# Patient Record
Sex: FEMALE | Race: BLACK OR AFRICAN AMERICAN
[De-identification: names, ages, dates, MRNs, and addresses within clinical notes are randomized per-mention and may not be internally consistent; named-entity substitution may affect disease eponyms.]

---

## 2018-01-08 ENCOUNTER — HOSPITAL ENCOUNTER (INPATIENT)
Dept: HOSPITAL 26 - MED | Age: 23
LOS: 2 days | Discharge: HOME | DRG: 781 | End: 2018-01-10
Attending: FAMILY MEDICINE | Admitting: FAMILY MEDICINE
Payer: SELF-PAY

## 2018-01-08 VITALS — SYSTOLIC BLOOD PRESSURE: 109 MMHG | DIASTOLIC BLOOD PRESSURE: 75 MMHG

## 2018-01-08 VITALS — SYSTOLIC BLOOD PRESSURE: 113 MMHG | DIASTOLIC BLOOD PRESSURE: 72 MMHG

## 2018-01-08 VITALS — WEIGHT: 174 LBS | BODY MASS INDEX: 26.37 KG/M2 | HEIGHT: 68 IN

## 2018-01-08 DIAGNOSIS — O25.11: ICD-10-CM

## 2018-01-08 DIAGNOSIS — N17.0: ICD-10-CM

## 2018-01-08 DIAGNOSIS — O99.011: ICD-10-CM

## 2018-01-08 DIAGNOSIS — G43.909: ICD-10-CM

## 2018-01-08 DIAGNOSIS — O99.351: ICD-10-CM

## 2018-01-08 DIAGNOSIS — O26.831: ICD-10-CM

## 2018-01-08 DIAGNOSIS — G89.29: ICD-10-CM

## 2018-01-08 DIAGNOSIS — Z3A.10: ICD-10-CM

## 2018-01-08 DIAGNOSIS — O23.41: ICD-10-CM

## 2018-01-08 DIAGNOSIS — D64.9: ICD-10-CM

## 2018-01-08 DIAGNOSIS — Z90.89: ICD-10-CM

## 2018-01-08 DIAGNOSIS — O21.0: Primary | ICD-10-CM

## 2018-01-08 DIAGNOSIS — G92: ICD-10-CM

## 2018-01-08 DIAGNOSIS — F12.10: ICD-10-CM

## 2018-01-08 DIAGNOSIS — E44.0: ICD-10-CM

## 2018-01-08 DIAGNOSIS — Z88.0: ICD-10-CM

## 2018-01-08 LAB
ALBUMIN FLD-MCNC: 3.2 G/DL (ref 3.4–5)
AMYLASE SERPL-CCNC: 66 U/L (ref 25–115)
ANION GAP SERPL CALCULATED.3IONS-SCNC: 14.6 MMOL/L (ref 8–16)
APPEARANCE UR: (no result)
AST SERPL-CCNC: 19 U/L (ref 15–37)
BASOPHILS # BLD AUTO: 0.3 K/UL (ref 0–0.22)
BASOPHILS NFR BLD AUTO: 0 % (ref 0–2)
BILIRUB SERPL-MCNC: 0.3 MG/DL (ref 0–1)
BILIRUB UR QL STRIP: (no result)
BUN SERPL-MCNC: 8 MG/DL (ref 7–18)
CHLORIDE SERPL-SCNC: 102 MMOL/L (ref 98–107)
CHOLEST/HDLC SERPL: 2.8 {RATIO} (ref 1–4.5)
CO2 SERPL-SCNC: 25.8 MMOL/L (ref 21–32)
COLOR UR: YELLOW
CREAT SERPL-MCNC: 0.6 MG/DL (ref 0.6–1.3)
EOSINOPHIL # BLD AUTO: 0 K/UL (ref 0–0.4)
EOSINOPHIL NFR BLD AUTO: 0 % (ref 0–4)
ERYTHROCYTE [DISTWIDTH] IN BLOOD BY AUTOMATED COUNT: 14.8 % (ref 11.6–13.7)
GFR SERPL CREATININE-BSD FRML MDRD: 164 ML/MIN (ref 90–?)
GLUCOSE SERPL-MCNC: 89 MG/DL (ref 74–106)
GLUCOSE UR STRIP-MCNC: NEGATIVE MG/DL
HCT VFR BLD AUTO: 36.5 % (ref 36–48)
HDLC SERPL-MCNC: 63 MG/DL (ref 40–60)
HGB BLD-MCNC: 11.6 G/DL (ref 12–16)
HGB UR QL STRIP: NEGATIVE
LDLC SERPL CALC-MCNC: 97 MG/DL (ref 60–100)
LEUKOCYTE ESTERASE UR QL STRIP: (no result)
LIPASE SERPL-CCNC: 143 U/L (ref 73–393)
LYMPHOCYTES # BLD AUTO: 1.5 K/UL (ref 2.5–16.5)
LYMPHOCYTES NFR BLD AUTO: 25 % (ref 20.5–51.1)
MAGNESIUM SERPL-MCNC: 1.7 MG/DL (ref 1.8–2.4)
MCH RBC QN AUTO: 25 PG (ref 27–31)
MCHC RBC AUTO-ENTMCNC: 32 G/DL (ref 33–37)
MCV RBC AUTO: 79 FL (ref 80–94)
MONOCYTES # BLD AUTO: 0.5 K/UL (ref 0.8–1)
MONOCYTES NFR BLD AUTO: 9 % (ref 1.7–9.3)
NEUTROPHILS # BLD AUTO: 3.8 K/UL (ref 1.8–7.7)
NEUTROPHILS NFR BLD AUTO: 66 % (ref 42.2–75.2)
NITRITE UR QL STRIP: NEGATIVE
PH UR STRIP: 6 [PH] (ref 5–9)
PHOSPHATE SERPL-MCNC: 4.6 MG/DL (ref 2.5–4.9)
PLATELET # BLD AUTO: 229 K/UL (ref 140–450)
POTASSIUM SERPL-SCNC: 3.4 MMOL/L (ref 3.5–5.1)
PROTHROMBIN TIME: 10.6 SECS (ref 10.8–13.4)
RBC # BLD AUTO: 4.61 MIL/UL (ref 4.2–5.4)
RBC #/AREA URNS HPF: (no result) /HPF (ref 0–5)
SODIUM SERPL-SCNC: 139 MMOL/L (ref 136–145)
T4 FREE SERPL-MCNC: 1.5 NG/DL (ref 0.76–1.46)
TRIGL SERPL-MCNC: 71 MG/DL (ref 30–150)
TSH SERPL DL<=0.05 MIU/L-ACNC: 0.47 UIU/ML (ref 0.34–3.74)
WBC # BLD AUTO: 6.1 K/UL (ref 4.5–11)
WBC,URINE: (no result) /HPF (ref 0–5)

## 2018-01-08 PROCEDURE — S0119 ONDANSETRON 4 MG: HCPCS

## 2018-01-08 RX ADMIN — SODIUM CHLORIDE SCH MLS/HR: 9 INJECTION, SOLUTION INTRAVENOUS at 23:00

## 2018-01-08 NOTE — NUR
DR LOPEZ AT BEDSIDE TO DISCUSS PLAN OF CARE WITH PT AND PT'S MOTHER.

INSERTED NEW IV ON L FOREARM 22G, ASYMPTOMATIC, PATENT AND INTACT, ADMINISTERED IVF AS 
ORDERED WITH EDUCATION.

PT IS NOW ON CLEAR LIQUID DIET, PROVIDED PT WITH WATER, JELLO AND APPLE JUICE, NO 
NAUSEA/VOMITING AT THIS TIME.

COLLECTED URINE SAMPLE, WILL SEND TO LAB.

## 2018-01-08 NOTE — NUR
22F BIB MOTHER C/O GENERALIZED WEAKNESS X 2 WKS WITH HYPEREMESIS; PT STATES NO 
DIARRHEA AT THIS TIME; ABDOMEN SOFT, NON-TENDER, ACTIVE BOWEL SOUNDS X 4 
QUADRANTS; PT C/O PELVIC PAIN, RADIATES TO LOWER ABDOMEN, 5/10 X 1 WEEK; PT C/O 
URINARY BURNING, URINARY FREQUENCY AND URINARY RETENTION X 3 WEEKS; PT SEEN AT 
[facility] 12/26/17 DX SYNCOPE, UTI, BUT UNABLE TO TAKE MEDS GIVEN 
D/T HYPEREMESIS; PT STATES 10 WEEKS PREGANT AT THIS TIME; PT AA&OX4, PERRLA, BL 
LUNG SOUNDS CLEAR, RR EVEN/UNLABORED, SKIN IS WARM/DRY/INTACT AT THIS TIME; PT 
RESTING IN BED WITH HOB ELEVATED AND IN LOWEST POSITION; POSITIONED FOR 
COMFORT; ER MD MADE AWARE OF STATUS. WILL CONTINUE TO MONITOR.

## 2018-01-08 NOTE — NUR
Admitted from ER, with chief complaint of WORSENING INTERMITTENT NAUSEA/VOMITING FOR 3 
WEEKS, DX UTI/HYPEREMESIS GRAVIDARUM. ACCOMPANIED BY PT'S MOTHER. 21 y/o, Female, 
Cooperative, AOX4, AMBULATORY, ABLE TO VERBALIZE NEEDS.

CARDIAC MONITOR IN PLACE. PT DENIES CHEST PAIN, ABD PAIN, C/O BACK PAIN, PAIN TOLERABLE. PT 
REPORTS RELIEF OF NAUSEA/VOMITING AFTER PHENERAN IN ER. BOWEL SOUNDS ACTIVE ON ALL 
QUADRANTS, PT REPORTS LAST BM YESTERDAY. PT REPORTS BEING 10 WEEKS PREGNANT. 

PT REPORTS PAIN AT IV SITE, REQUESTS NEW IV, WILL INSERT NEW IV. 

DISCUSSED AND REVIEWED PLAN OF CARE WITH PT AND PT'S MOTHER, VERBALIZED UNDERSTANDING. 

oriented to call light, bed, phone,television, bathroom, smoking policy,

visiting hours, procedures, ID bracelet on. Belongings list checked.

ALL NEEDS MET. SAFETY MEASURES ENSURED. CALL LIGHT WITHIN REACH. WILL CONTINUE TO MONITOR.

## 2018-01-08 NOTE — NUR
Pt transferred to Tele via cardiac monitoring. All belongings sent with 
patient. Mother also accompanied pt to the floor upon admission.

## 2018-01-09 VITALS — DIASTOLIC BLOOD PRESSURE: 68 MMHG | SYSTOLIC BLOOD PRESSURE: 111 MMHG

## 2018-01-09 VITALS — SYSTOLIC BLOOD PRESSURE: 110 MMHG | DIASTOLIC BLOOD PRESSURE: 72 MMHG

## 2018-01-09 VITALS — SYSTOLIC BLOOD PRESSURE: 100 MMHG | DIASTOLIC BLOOD PRESSURE: 56 MMHG

## 2018-01-09 LAB
ANION GAP SERPL CALCULATED.3IONS-SCNC: 13.6 MMOL/L (ref 8–16)
BARBITURATES UR QL SCN: (no result) NG/ML
BENZODIAZ UR QL SCN: (no result) NG/ML
BUN SERPL-MCNC: 5 MG/DL (ref 7–18)
BZE UR QL SCN: (no result) NG/ML
CANNABINOIDS UR QL SCN: (no result) NG/ML
CHLORIDE SERPL-SCNC: 106 MMOL/L (ref 98–107)
CO2 SERPL-SCNC: 24.7 MMOL/L (ref 21–32)
CREAT SERPL-MCNC: 0.5 MG/DL (ref 0.6–1.3)
EOSINOPHIL NFR BLD MANUAL: 1 % (ref 0–4)
ERYTHROCYTE [DISTWIDTH] IN BLOOD BY AUTOMATED COUNT: 14.4 % (ref 11.6–13.7)
GFR SERPL CREATININE-BSD FRML MDRD: 202 ML/MIN (ref 90–?)
GLUCOSE SERPL-MCNC: 79 MG/DL (ref 74–106)
HCT VFR BLD AUTO: 34.4 % (ref 36–48)
HGB BLD-MCNC: 10.7 G/DL (ref 12–16)
IRON SERPL-MCNC: 114 UG/DL (ref 35–150)
LYMPHOCYTES NFR BLD MANUAL: 43 % (ref 20–46)
MAGNESIUM SERPL-MCNC: 1.8 MG/DL (ref 1.8–2.4)
MCH RBC QN AUTO: 25 PG (ref 27–31)
MCHC RBC AUTO-ENTMCNC: 31 G/DL (ref 33–37)
MCV RBC AUTO: 80 FL (ref 80–94)
MONOCYTES NFR BLD MANUAL: 4 % (ref 5–12)
OPIATES UR QL SCN: (no result) NG/ML
PCP UR QL SCN: (no result) NG/ML
PHOSPHATE SERPL-MCNC: 4.3 MG/DL (ref 2.5–4.9)
PLATELET # BLD AUTO: 208 K/UL (ref 140–450)
POTASSIUM SERPL-SCNC: 3.3 MMOL/L (ref 3.5–5.1)
RBC # BLD AUTO: 4.3 MIL/UL (ref 4.2–5.4)
SODIUM SERPL-SCNC: 141 MMOL/L (ref 136–145)
TIBC SERPL-MCNC: 295 UG/DL (ref 250–450)
WBC # BLD AUTO: 5.9 K/UL (ref 4.5–11)

## 2018-01-09 RX ADMIN — SODIUM CHLORIDE SCH MLS/HR: 9 INJECTION, SOLUTION INTRAVENOUS at 08:05

## 2018-01-09 RX ADMIN — Medication SCH EACH: at 09:17

## 2018-01-09 RX ADMIN — PYRIDOXINE HCL TAB 50 MG SCH MG: 50 TAB at 09:18

## 2018-01-09 RX ADMIN — SODIUM CHLORIDE SCH MLS/HR: 9 INJECTION, SOLUTION INTRAVENOUS at 21:00

## 2018-01-09 NOTE — NUR
SPOKE WITH DR LOPEZ, MADE MD AWARE THAT PT IS C/O HEADACHE, PT REQUESTING FOR FIORICET WHICH 
PT REPORTS TO BE PRESCRIBED TO HER BY HER PCP. DR LOPEZ AT BEDSIDE TO DISCUSS MEDICATION 
RISKS AND BENEFITS FOR PT. PT VERBALIZED UNDERSTANDING. UNABLE TO PULL FIORICET FROM [employer], 
RECEIVED MED FROM HOUSE SUPERVISOR. ADMINISTERED FIORICET PO PRN WITH EDUCATION OF BENEFITS 
AND RISKS. PT VERBALIZED UNDERSTANDING.

ALL NEEDS MET. IVF INFUSING WELL. SAFETY MEASURES ENSURED. CALL LIGHT WITHIN REACH.

## 2018-01-09 NOTE — NUR
PATIENT REPORT RECEIVED FROM MORNING NURSE AT BEDSIDE. PATIENT'S MOTHER IS AT BEDSIDE. NO 
SIGNS AND SYMPTOMS OF DISTRESS NOTED. NO COMPLAINTS OF PAIN AT THIS TIME. IV SITE NOTED ON 
LEFT HAND, IVF INFUSING WELL. BED IN LOWEST POSITION, SIDE RAILS UP AND CALL LIGHT WITHIN 
REACH. WILL CONTINUE TO MONITOR.

## 2018-01-09 NOTE — NUR
PT'S DAD IS HERE, PT IS CALM IN BED, NO S/S OF DISTRESS. PT ATE A LITTLE BIT OF HER LUNCH 
BECAUSE SHE DOESN'T LIKE HER FOOD. SUGGESTED PT TO EAT SMALL QUANTITY AT A TIME THROUGHOUT 
THE DAY. CALLED [employer] TO SEND A MEAL SELECTION FORM FOR PT.

## 2018-01-09 NOTE — NUR
PATIENT HAS BEEN SCREENED AND CATEGORIZED AS HIGH NUTRITION RISK. PATIENT WILL BE SEEN 
WITHIN 1-2 DAYS OF ADMISSION.



1/9/18 - 1/10/18



LM [address]

## 2018-01-09 NOTE — NUR
RECEIVED PT REPORTED FROM NIGHT SHIFT NURSE.  PT IS AWAKE, ALERT, OX4, AMBULATORY. ON TELE. 
PT DENIES PAIN AT THIS TIME. PT REPORTS BEING 10 WEEKS PREGNANT. IV NOTED TO THE LEFT HAND 
22G, INFUSING WELL. ALL NEEDS MET. SAFETY MEASURES ENSURED. CALL LIGHT AND PHONE WITHIN 
REACH. WILL CONTINUE TO MONITOR.

## 2018-01-10 VITALS — DIASTOLIC BLOOD PRESSURE: 59 MMHG | SYSTOLIC BLOOD PRESSURE: 107 MMHG

## 2018-01-10 VITALS — DIASTOLIC BLOOD PRESSURE: 68 MMHG | SYSTOLIC BLOOD PRESSURE: 118 MMHG

## 2018-01-10 LAB
ANION GAP SERPL CALCULATED.3IONS-SCNC: 14.1 MMOL/L (ref 8–16)
BUN SERPL-MCNC: 1 MG/DL (ref 7–18)
CHLORIDE SERPL-SCNC: 109 MMOL/L (ref 98–107)
CO2 SERPL-SCNC: 21.5 MMOL/L (ref 21–32)
CREAT SERPL-MCNC: 0.4 MG/DL (ref 0.6–1.3)
EOSINOPHIL NFR BLD MANUAL: 3 % (ref 0–4)
ERYTHROCYTE [DISTWIDTH] IN BLOOD BY AUTOMATED COUNT: 14.7 % (ref 11.6–13.7)
FOLATE SERPL-MCNC: 12.7 NG/ML (ref 3–?)
GFR SERPL CREATININE-BSD FRML MDRD: 262 ML/MIN (ref 90–?)
GLUCOSE SERPL-MCNC: 88 MG/DL (ref 74–106)
HCT VFR BLD AUTO: 34.5 % (ref 36–48)
HGB BLD-MCNC: 10.7 G/DL (ref 12–16)
LYMPHOCYTES NFR BLD MANUAL: 38 % (ref 20–46)
MAGNESIUM SERPL-MCNC: 1.8 MG/DL (ref 1.8–2.4)
MCH RBC QN AUTO: 25 PG (ref 27–31)
MCHC RBC AUTO-ENTMCNC: 31 G/DL (ref 33–37)
MCV RBC AUTO: 80 FL (ref 80–94)
MONOCYTES NFR BLD MANUAL: 9 % (ref 5–12)
PHOSPHATE SERPL-MCNC: 3.7 MG/DL (ref 2.5–4.9)
PLATELET # BLD AUTO: 206 K/UL (ref 140–450)
POTASSIUM SERPL-SCNC: 3.6 MMOL/L (ref 3.5–5.1)
RBC # BLD AUTO: 4.3 MIL/UL (ref 4.2–5.4)
SODIUM SERPL-SCNC: 141 MMOL/L (ref 136–145)
T3RU NFR SERPL: 21 % (ref 24–39)
T4 SERPL-MCNC: 14.4 UG/DL (ref 4.5–12)
TRANSFERRIN SERPL-MCNC: 252 MG/DL (ref 200–370)
VIT B12 SERPL-MCNC: 838 PG/ML (ref 232–1245)
WBC # BLD AUTO: 4.9 K/UL (ref 4.5–11)

## 2018-01-10 RX ADMIN — SODIUM CHLORIDE SCH MLS/HR: 9 INJECTION, SOLUTION INTRAVENOUS at 14:05

## 2018-01-10 RX ADMIN — PYRIDOXINE HCL TAB 50 MG SCH MG: 50 TAB at 09:00

## 2018-01-10 RX ADMIN — METOCLOPRAMIDE PRN MG: 5 INJECTION, SOLUTION INTRAMUSCULAR; INTRAVENOUS at 18:27

## 2018-01-10 RX ADMIN — SODIUM CHLORIDE SCH MLS/HR: 9 INJECTION, SOLUTION INTRAVENOUS at 04:05

## 2018-01-10 RX ADMIN — Medication SCH EACH: at 09:00

## 2018-01-10 RX ADMIN — METOCLOPRAMIDE PRN MG: 5 INJECTION, SOLUTION INTRAMUSCULAR; INTRAVENOUS at 12:07

## 2018-01-10 NOTE — NUR
ADMINISTERED REGLAN FOR NAUSEA. PT STATED SHE HAD NO BM TODAY. GAVE PRUNE JUICE. PT 
TOLERATED WELL. STARTED NEW BAG OF NS. INFUSING AT 100ML/HR. PT DENIES PAIN. SPOKE WITH PT'S 
MOM ON PHONE. MADE AWARE OF PLAN FOR D/C TODAY. PT IS LYING IN BED ON LAPTOP. CALL LIGHT 
WITHIN REACH. WILL CONTINUE TO MONITOR.

## 2018-01-10 NOTE — NUR
ADMINISTERED SCHEDULED MEDS. PT COMPLAINED OF HEADACHE. ADMINISTERED TYLENOL. PT TOLERATED 
MEDS WELL. NO NAUSEA OR VOMITING NOTED. PT IN STABLE CONDITION. WILL CONTINUE TO MONITOR.

## 2018-01-10 NOTE — NUR
GAVE D/C FORMS AND INSTRUCTIONS WITH RX, LABS AND FOLLOW UP APPOINTMENT TO PT. PT VERBALIZED 
UNDERSTANDING AND SIGNED FORMS. D/C IV FROM LEFT HAND 22G. IV CATHETER TIP INTACT. APPLIED 
DRESSING TO SITE AND PRESSURE. NO BLEEDING NOTED. PT WAITING FOR MOM TO ARRIVE. GATHERED ALL 
PERSONAL BELONGINGS.